# Patient Record
Sex: FEMALE | URBAN - METROPOLITAN AREA
[De-identification: names, ages, dates, MRNs, and addresses within clinical notes are randomized per-mention and may not be internally consistent; named-entity substitution may affect disease eponyms.]

---

## 2019-07-10 ENCOUNTER — TELEPHONE (OUTPATIENT)
Dept: OPHTHALMOLOGY | Facility: CLINIC | Age: 49
End: 2019-07-10

## 2019-07-10 NOTE — TELEPHONE ENCOUNTER
Call the number on the message the message was taken care of yesterday by Brittany. The name on the message is not correct. Contact lenses prescription was corrected and fax to patient.

## 2019-07-10 NOTE — TELEPHONE ENCOUNTER
----- Message from Fadumozeeshan Lyman sent at 7/9/2019 12:26 PM CDT -----  Contact: Self  Patient requesting a call back regarding recent prescriptions for her contacts. She states that she tried to fill the prescription and was told that the brand has been discontinued since 2018 ([employer]). She would like a new prescription for a different brand. Please call patient back at [phone]